# Patient Record
Sex: MALE | Race: WHITE | URBAN - METROPOLITAN AREA
[De-identification: names, ages, dates, MRNs, and addresses within clinical notes are randomized per-mention and may not be internally consistent; named-entity substitution may affect disease eponyms.]

---

## 2017-10-21 PROBLEM — F11.93 OPIATE WITHDRAWAL (HCC): Status: ACTIVE | Noted: 2017-10-21

## 2018-01-26 ENCOUNTER — TELEPHONE (OUTPATIENT)
Dept: ORTHOPEDIC SURGERY | Age: 28
End: 2018-01-26

## 2018-01-29 NOTE — TELEPHONE ENCOUNTER
Pt called checking on appt, I informed him that we were still waiting on review by physician. He requested the name and phone number of another orthopedic in [place]. I provided him with Dr. Beti Lagunas office information.

## 2018-01-29 NOTE — TELEPHONE ENCOUNTER
Dr. Beau Perea was on-call when he came in so if he can get in there that would be fine    If he can not get it in with Dr. Beau Perea I can see him next week sometime     Inform him, unless directed by another physician, that he should continue his sling for at least 2 weeks